# Patient Record
Sex: FEMALE | Race: ASIAN | NOT HISPANIC OR LATINO | URBAN - METROPOLITAN AREA
[De-identification: names, ages, dates, MRNs, and addresses within clinical notes are randomized per-mention and may not be internally consistent; named-entity substitution may affect disease eponyms.]

---
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
## 2024-04-13 ENCOUNTER — NON-APPOINTMENT (OUTPATIENT)
Age: 19
End: 2024-04-13

## 2024-05-17 NOTE — ED ADULT TRIAGE NOTE - STATUS:
1145 Patient arrived to [place] after procedure with Anesthesia and procedure RN, procedure discussed, plan reviewed, VSS    1200 family updated via secure text    1203 OPA removed    1212 pt medicated per order for pain     1220 handoff  to ITALO Callahan RN    1240 this Rn assuming care of pt at this time    1252 pt medicated for pain per order, 5/10 5mg oxycodone    1310 extended care    1315 family at bedside    Patient name & assigned room #726 [facility]  Procedure: Lap appy  Anesthesia type (i.e. general, regional, MAC):general  Estimated blood loss (EBL) in OR:minimal  Relevant PMH: GERD, Fibromyalgia, migraines. Anxiety, arthritis  Orientation/mental status:A&Ox3  Incision site(s)/location, surgical dressing(s), and drain type/location: 3 port sites dermabond  Fluids given in OR/PACU, IV site(s), and drips/fluids currently infusinml/hr in PACU  PO status (last oral intake): tolerating ginger ale and snacks in PACU  Last set of vital signs & O2 requirements: VSS, nasal cannula @ 2l/min, last /84  Current pain level & last pain/nausea medication dose/time given: 4/10 received 5mg oxycodone at 1252   Last void/Rubio in place: void by 1800  SCDs on (yes/no): yes    1340 report sent to 7th floor RN    1425 Patient discharged in stable condition  to inpatient unit via bed.    Intact

## 2024-12-04 NOTE — ED PEDIATRIC NURSE NOTE - CHILD ABUSE FACILITY
Assessment & Plan         ICD-10-CM    1. Class 2 obesity due to excess calories without serious comorbidity with body mass index (BMI) of 36.0 to 36.9 in adult  E66.812     E66.09     Z68.36       2. Car sickness, subsequent encounter  T75.3XXD ondansetron (ZOFRAN) 8 MG tablet      3. Mood swings  R45.86 venlafaxine (EFFEXOR XR) 150 MG 24 hr capsule          Patient had been taking phentermine and had been completing the usual course.  We had talked about the options for different medications and has found a program called [facility] that has been able to provide her with medications.  As they are monitoring into their current regimen we we will let them continue most of their work but did tell her that it was time to stop the phentermine.  We are happy to see the progress that she has made and hope that she continues with her current regimen.  We certainly can continue with prescriptions on her Ozempic if she chooses to transfer to us at some point but did warn her that the cash pay cost is something dictate into consideration and to continue to work with manufacturers for coupons versus [facility] program to see which 1  is least costly  She was also due for her Effexor and is doing well on her mood swings at this time though she did add the estradiol Vivelle dot and is even better with this as well as has reduced her hot flushes significantly.  She did see an increase in carsickness after starting Ozempic and so we did talk about fluid hydration and the changes that can occur with this.  We would encourage her to continue with at least 64 ounces of water a day to try and maintain this as likely the increase is related to the medication.  It is okay to take some as needed Zofran but needs to be on top of her stooling pattern and hydration to try and minimize how much she needs this overall.    I spent a total of 45 minutes on the day of the visit.   Time spent by me today doing chart review, history and exam,  "documentation and further activities per the note    Rosetta Nicole MD           BMI  Estimated body mass index is 31.42 kg/m  as calculated from the following:    Height as of 11/27/24: 1.654 m (5' 5.12\").    Weight as of this encounter: 86 kg (189 lb 8 oz).   Weight management plan: Anders Vernon is a 42 year old, presenting for the following health issues:  Recheck Medication        12/4/2024     2:44 PM   Additional Questions   Roomed by dasia   Accompanied by self     History of Present Illness       Reason for visit:  Med check/wt management    She eats 2-3 servings of fruits and vegetables daily.She consumes 0 sweetened beverage(s) daily.She exercises with enough effort to increase her heart rate 20 to 29 minutes per day.  She exercises with enough effort to increase her heart rate 4 days per week.   She is taking medications regularly.                 Review of Systems  Constitutional, HEENT, cardiovascular, pulmonary, GI, , musculoskeletal, neuro, skin, endocrine and psych systems are negative, except as otherwise noted.      Objective    /81   Pulse 63   Temp 97.6  F (36.4  C) (Temporal)   Resp 14   Wt 86 kg (189 lb 8 oz)   LMP 01/19/2023   SpO2 96%   BMI 31.42 kg/m    Body mass index is 31.42 kg/m .  Physical Exam   GENERAL: alert and no distress  RESP: lungs clear to auscultation - no rales, rhonchi or wheezes  CV: regular rate and rhythm, normal S1 S2, no S3 or S4, no murmur, click or rub, no peripheral edema  ABDOMEN: soft, nontender, no hepatosplenomegaly, no masses and bowel sounds normal  MS: no gross musculoskeletal defects noted, no edema  SKIN: no suspicious lesions or rashes  NEURO: Normal strength and tone, mentation intact and speech normal  PSYCH: mentation appears normal, affect normal/bright  Some tenting the skin was noted as well as dry mucous membranes          Signed Electronically by: Rosetta Nicole MD, MD    " DELFINA